# Patient Record
Sex: MALE | Race: WHITE | HISPANIC OR LATINO | Employment: FULL TIME | URBAN - NONMETROPOLITAN AREA
[De-identification: names, ages, dates, MRNs, and addresses within clinical notes are randomized per-mention and may not be internally consistent; named-entity substitution may affect disease eponyms.]

---

## 2024-02-19 ENCOUNTER — HOSPITAL ENCOUNTER (EMERGENCY)
Facility: HOSPITAL | Age: 44
Discharge: HOME | End: 2024-02-19
Attending: EMERGENCY MEDICINE
Payer: MEDICAID

## 2024-02-19 VITALS
TEMPERATURE: 98.2 F | DIASTOLIC BLOOD PRESSURE: 87 MMHG | WEIGHT: 215 LBS | HEIGHT: 67 IN | BODY MASS INDEX: 33.74 KG/M2 | RESPIRATION RATE: 14 BRPM | SYSTOLIC BLOOD PRESSURE: 154 MMHG | OXYGEN SATURATION: 96 % | HEART RATE: 85 BPM

## 2024-02-19 DIAGNOSIS — M54.32 SCIATICA, LEFT SIDE: Primary | ICD-10-CM

## 2024-02-19 PROCEDURE — 2500000004 HC RX 250 GENERAL PHARMACY W/ HCPCS (ALT 636 FOR OP/ED): Mod: SE | Performed by: EMERGENCY MEDICINE

## 2024-02-19 PROCEDURE — 99283 EMERGENCY DEPT VISIT LOW MDM: CPT

## 2024-02-19 PROCEDURE — 2500000001 HC RX 250 WO HCPCS SELF ADMINISTERED DRUGS (ALT 637 FOR MEDICARE OP): Mod: SE

## 2024-02-19 RX ORDER — IBUPROFEN 600 MG/1
600 TABLET ORAL ONCE
Status: COMPLETED | OUTPATIENT
Start: 2024-02-19 | End: 2024-02-19

## 2024-02-19 RX ORDER — IBUPROFEN 600 MG/1
TABLET ORAL
Status: COMPLETED
Start: 2024-02-19 | End: 2024-02-19

## 2024-02-19 RX ORDER — PREDNISONE 20 MG/1
60 TABLET ORAL ONCE
Status: COMPLETED | OUTPATIENT
Start: 2024-02-19 | End: 2024-02-19

## 2024-02-19 RX ORDER — PREDNISONE 20 MG/1
40 TABLET ORAL DAILY
Qty: 10 TABLET | Refills: 0 | Status: SHIPPED | OUTPATIENT
Start: 2024-02-19 | End: 2024-02-24

## 2024-02-19 RX ADMIN — PREDNISONE 60 MG: 20 TABLET ORAL at 16:00

## 2024-02-19 RX ADMIN — IBUPROFEN 600 MG: 600 TABLET ORAL at 16:08

## 2024-02-19 ASSESSMENT — PAIN DESCRIPTION - PAIN TYPE: TYPE: ACUTE PAIN

## 2024-02-19 ASSESSMENT — PAIN SCALES - GENERAL: PAINLEVEL_OUTOF10: 9

## 2024-02-19 ASSESSMENT — PAIN DESCRIPTION - DESCRIPTORS: DESCRIPTORS: ACHING

## 2024-02-19 ASSESSMENT — COLUMBIA-SUICIDE SEVERITY RATING SCALE - C-SSRS
2. HAVE YOU ACTUALLY HAD ANY THOUGHTS OF KILLING YOURSELF?: NO
1. IN THE PAST MONTH, HAVE YOU WISHED YOU WERE DEAD OR WISHED YOU COULD GO TO SLEEP AND NOT WAKE UP?: NO
6. HAVE YOU EVER DONE ANYTHING, STARTED TO DO ANYTHING, OR PREPARED TO DO ANYTHING TO END YOUR LIFE?: NO

## 2024-02-19 ASSESSMENT — PAIN - FUNCTIONAL ASSESSMENT: PAIN_FUNCTIONAL_ASSESSMENT: 0-10

## 2024-02-19 ASSESSMENT — PAIN DESCRIPTION - ORIENTATION: ORIENTATION: LEFT

## 2024-02-19 ASSESSMENT — PAIN DESCRIPTION - LOCATION: LOCATION: LEG

## 2024-02-19 NOTE — ED PROVIDER NOTES
Department of Emergency Medicine   ED  Provider Note  Admit Date/RoomTime: 2/19/2024  3:10 PM  ED Room: CHAIR03/CHAIR03                  History of Present Illness:   Dickson Kaplan is a 43 y.o. male presenting to the ED for left leg pain, beginning 2 days ago.  The complaint has been persistent, moderate in severity, and worsened by  movement and change in position .  Patient denies any trauma fall or injury.  Was seen at [place] emergency room for the earlier today normal yesterday was prescribed ibuprofen and a muscle relaxer.  States he was told it was his hamstring but he says this does not feel like his hamstring.  He complains of pain in the left buttocks that radiates down the posterolateral aspect of the left leg all the way down to the foot.  Seems to be worse with certain changes in position and movement.  No weakness of the extremity.  No paresthesia of the perineal area.  No loss of bowel or bladder control he does complain of occasional numbness and tingling in the left foot.  No associated abdominal pain no dysuria urgency or frequency.  No flank pain.  No chest pain shortness of breath or other complaints.  No fever or chills.      Review of Systems:   Pertinent positives and review of systems as noted above.  Remaining 10 review of systems is negative or noncontributory to today's episode of care.  Review of Systems   Complete review of systems is otherwise negative except as noted above    --------------------------------------------- PAST HISTORY ---------------------------------------------  Past Medical History:  has no past medical history on file.    Past Surgical History:  has no past surgical history on file.    Social History:  Positive for tobacco.  Negative for alcohol or recreational drugs.    Family History: family history is not on file. Unless otherwise noted, family history is non contributory    Patient's Medications   New Prescriptions    PREDNISONE (DELTASONE) 20 MG TABLET    Take 2  "tablets (40 mg) by mouth once daily for 5 days.   Previous Medications    No medications on file   Modified Medications    No medications on file   Discontinued Medications    No medications on file      The patient’s home medications have been reviewed.    Allergies: Patient has no known allergies.    -------------------------------------------------- RESULTS -------------------------------------------------  All laboratory and radiology results have been personally reviewed by myself   LABS:  Labs Reviewed - No data to display      RADIOLOGY:  Interpreted by Radiologist.  No orders to display       No results found for this or any previous visit (from the past 4464 hour(s)).  ------------------------- NURSING NOTES AND VITALS REVIEWED ---------------------------   The nursing notes within the ED encounter and vital signs as below have been reviewed.   /87 (BP Location: Left arm, Patient Position: Sitting)   Pulse 85   Temp 36.8 °C (98.2 °F) (Tympanic)   Resp 14   Ht 1.702 m (5' 7\")   Wt 97.5 kg (215 lb)   SpO2 96%   BMI 33.67 kg/m²   Oxygen Saturation Interpretation: Normal      ---------------------------------------------------PHYSICAL EXAM--------------------------------------  Physical Exam  Vitals and nursing note reviewed.   Constitutional:       General: He is not in acute distress.     Appearance: He is well-developed. He is not ill-appearing or toxic-appearing.   HENT:      Head: Normocephalic and atraumatic.      Nose: Nose normal.      Mouth/Throat:      Mouth: Mucous membranes are moist.      Pharynx: Oropharynx is clear.   Eyes:      General: No scleral icterus.     Conjunctiva/sclera: Conjunctivae normal.   Cardiovascular:      Rate and Rhythm: Normal rate and regular rhythm.      Heart sounds: No murmur heard.     No friction rub. No gallop.   Pulmonary:      Effort: Pulmonary effort is normal. No respiratory distress.      Breath sounds: Normal breath sounds. No wheezing, rhonchi or " rales.   Abdominal:      General: There is no distension.      Palpations: Abdomen is soft.      Tenderness: There is no abdominal tenderness. There is no right CVA tenderness, left CVA tenderness, guarding or rebound.   Musculoskeletal:         General: No swelling or deformity.      Cervical back: Neck supple. No rigidity or tenderness.      Right lower leg: No edema.      Left lower leg: No edema.      Comments: Straight leg raising is positive on the left for increasing pain.  Plantarflexion 5 out of 5, EHL 5 out of 5.  No ankle clonus.  Bilateral lower extremities are neurovascularly intact.  There is some pain on palpation over the left SI joint region.  No midline bony tenderness.   Skin:     General: Skin is warm and dry.      Capillary Refill: Capillary refill takes less than 2 seconds.   Neurological:      Mental Status: He is alert and oriented to person, place, and time.   Psychiatric:         Mood and Affect: Mood normal.            Procedures  None  ------------------------------ ED COURSE/MEDICAL DECISION MAKING----------------------    Medical Decision Making:   Patient was seen and evaluated by me.  Patient denies any history of trauma or injury.  He complains of pain starting the left buttock radiating down the left leg.  Occasional numbness and tingling of the distal left leg and foot.  Pain is worse with movement and change in position.  He has some pain in the left buttock and left low back region.  Straight leg raising is positive on the left.    Clinically I feel the patient has sciatica.  Patient declines pain medication at this time.  Patient will be started on prednisone 60 mg oral load here.  Patient was already prescribed ibuprofen and Flexeril and I encouraged him to continue taking that.  Patient will be discharged home on prednisone  Rx prednisone 20 mg, 2 tablets once a day for 5 days.  Patient encouraged to follow-up with primary care.  Use ice or heat as needed.  Information sheet  on acute sciatica was provided to the patient.  Patient is discharged home in stable condition.    Diagnoses as of 02/19/24 1550   Sciatica, left side      Counseling:   The emergency provider has spoken with the patient and discussed today’s results, in addition to providing specific details for the plan of care and counseling regarding the diagnosis and prognosis.  Questions are answered at this time and they are agreeable with the plan.      --------------------------------- IMPRESSION AND DISPOSITION ---------------------------------        IMPRESSION  1. Sciatica, left side        DISPOSITION  Disposition: Discharge to home  Patient condition is good      Billing Provider Critical Care Time: 0 minutes     Terrence Morataya,   02/19/24 1558

## 2024-02-19 NOTE — DISCHARGE INSTRUCTIONS
Follow-up with primary care in 4 to 5 days for recheck.  Take prescribed medications as directed.  You may take already prescribed medications as directed.   Take prednisone as directed.  First dose given here in the ED.  You may use ice or heat as needed.  Follow-up with primary care in 3 to 7 days for recheck, sooner if worse return.

## 2025-04-10 ENCOUNTER — APPOINTMENT (OUTPATIENT)
Dept: URGENT CARE | Facility: URGENT CARE | Age: 45
End: 2025-04-10